# Patient Record
(demographics unavailable — no encounter records)

---

## 2025-02-20 NOTE — ASSESSMENT
[FreeTextEntry1] : ======================================================================================= 1. 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 I explained the need for screening first degree relatives for elevated Lp(a) given its genetic etiology and for pursuing optimal LDL targets. I also explained that, at this time, the only commercially available agents with an effect on lowering Lp(a) are the PCSK9 inhibitors and inclisiran  - will pursue optimal modifiable cardiovascular risk factor control  - advised to pursue abstinence from alcohol and tobacco  - will initiate targeted therapy against lipoprotein a when commercially available (pelacarsen, olpasiran, lepodisiran, or zerlasiran)  - will not initiate antiplatelet therapy given active GERD  - will initiate statin therapy next visit if LDL remains > 100

## 2025-02-20 NOTE — REASON FOR VISIT
[Other: ____] : [unfilled] [FreeTextEntry1] : ======================================================================= Diagnostic Tests: -------------------------------------------------------------- EC25: sinus rhythm, normal ECG.  07/10/24: sinus rhythm, normal ECG.

## 2025-02-20 NOTE — HISTORY OF PRESENT ILLNESS
[FreeTextEntry1] : Mr. Kincaid presents for follow up and management of GERD and dyslipidemia (elevated lipoprotein a).  His father has dyslipidemia with an elevated LDL.  His PMD checked his lipid profile and included a screening lipoprotein a which was 140.5 nmol/liter (07/10/24). We had an extensive discussion about the elevated Lp(a) result and its relation to ASCVD and stroke. I explained the need for screening first degree relatives for elevated Lp(a) given its genetic etiology and for pursuing optimal LDL targets. I also explained that, at this time, the only commercially available agents with an effect on lowering Lp(a) are the PCSK9 inhibitors and inclisiran which lower the LP(a) about 25%. Of note, he admits to social tobacco use and occasional cocaine use.  In addition, he admits to poor dietary habits.